# Patient Record
(demographics unavailable — no encounter records)

---

## 2025-06-25 NOTE — ASSESSMENT
[FreeTextEntry1] : 2-week history of atraumatic shoulder pain with unremarkable x-rays.  Reviewed the diagnosis of frozen shoulder in detail.  Although I expect good outcomes with conservative measures the prognosis uncertain.  Some patients do require manipulation under anesthesia and experience a prolonged recovery.  Reasonable to start with a course of physical therapy.  I have started her on a Medrol Dosepak prescription to alleviate the associated symptoms.  Side effects reviewed.  Follow-up in 4 to 6 weeks to reassess.  Structured home exercise program was reviewed.  The patient's current medication management of their orthopedic diagnosis was reviewed today: There is a moderate risk of morbidity with further treatment, especially from use of prescription strength medications and possible side effects of these medications which include upset stomach with oral medications, skin reactions to topical medications and cardiac/renal/diabetes issues with long term use.

## 2025-06-25 NOTE — HISTORY OF PRESENT ILLNESS
[Right Arm] : right arm [6] : 6 [9] : 9 [Localized] : localized [Sharp] : sharp [Meds] : meds [Lying in bed] : lying in bed [de-identified] : 6/25/2025 New patient is here for RT shoulder pain. Patient states that last Thursday she woke up feeling discomfort in her shoulder. Couldn't lift her arm up, and felt a sharp pain and limpness like it was "dangling." Feels a burning and painful sensation. No bruising or swelling. No numbness, tingling, or weakness. Takes tylenol for the pain.  [FreeTextEntry9] : Tylenol helps to sleep  [de-identified] : movement

## 2025-06-25 NOTE — IMAGING
[de-identified] : Shoulder Exam: Inspection: Mild swelling, no ecchymosis, no kaylan deformity Palpation: Tenderness to palpation globally over shoulder Stability: No instability or tenderness over AC joint Range of motion: ROM guarded by pain; there is pain with strength testing Special testing: Positive Keenan test, positive impingement sign; speeds and yergason negative Motor and sensory intact distally No pain with cervical range of motion [Right] : right shoulder [There are no fractures, subluxations or dislocations. No significant abnormalities are seen] : There are no fractures, subluxations or dislocations. No significant abnormalities are seen [Type 2 acromion] : Type 2 acromion